# Patient Record
Sex: MALE | Race: WHITE | NOT HISPANIC OR LATINO | Employment: OTHER | URBAN - METROPOLITAN AREA
[De-identification: names, ages, dates, MRNs, and addresses within clinical notes are randomized per-mention and may not be internally consistent; named-entity substitution may affect disease eponyms.]

---

## 2023-06-28 ENCOUNTER — OFFICE VISIT (OUTPATIENT)
Dept: GASTROENTEROLOGY | Facility: CLINIC | Age: 43
End: 2023-06-28
Payer: OTHER GOVERNMENT

## 2023-06-28 VITALS — BODY MASS INDEX: 23.76 KG/M2 | HEIGHT: 68 IN | WEIGHT: 156.75 LBS

## 2023-06-28 DIAGNOSIS — K59.09 INTERMITTENT CONSTIPATION: ICD-10-CM

## 2023-06-28 DIAGNOSIS — Z87.898 HISTORY OF SHORTNESS OF BREATH: ICD-10-CM

## 2023-06-28 DIAGNOSIS — D64.9 NORMOCYTIC ANEMIA: ICD-10-CM

## 2023-06-28 DIAGNOSIS — Z86.010 HISTORY OF COLON POLYPS: ICD-10-CM

## 2023-06-28 DIAGNOSIS — K92.1 HEMATOCHEZIA: Primary | ICD-10-CM

## 2023-06-28 PROBLEM — F19.10 POLYSUBSTANCE ABUSE: Status: ACTIVE | Noted: 2023-06-28

## 2023-06-28 PROBLEM — F41.1 GENERALIZED ANXIETY DISORDER: Status: ACTIVE | Noted: 2023-06-28

## 2023-06-28 PROBLEM — M17.12 OSTEOARTHRITIS OF LEFT KNEE: Status: ACTIVE | Noted: 2023-06-28

## 2023-06-28 PROBLEM — N52.9 MALE ERECTILE DISORDER: Status: ACTIVE | Noted: 2023-06-28

## 2023-06-28 PROBLEM — F31.9 BIPOLAR DISORDER: Status: ACTIVE | Noted: 2023-06-28

## 2023-06-28 PROBLEM — F17.200 NICOTINE DEPENDENCE: Status: ACTIVE | Noted: 2023-06-28

## 2023-06-28 PROBLEM — K64.4 EXTERNAL HEMORRHOIDS: Status: ACTIVE | Noted: 2023-06-28

## 2023-06-28 PROBLEM — F11.11 HISTORY OF OPIOID ABUSE: Status: ACTIVE | Noted: 2023-06-28

## 2023-06-28 PROBLEM — Z59.00 HOMELESS SINGLE PERSON: Status: ACTIVE | Noted: 2023-06-28

## 2023-06-28 PROBLEM — K63.5 POLYP OF COLON: Status: ACTIVE | Noted: 2023-06-28

## 2023-06-28 PROBLEM — D50.9 IRON DEFICIENCY ANEMIA: Status: ACTIVE | Noted: 2023-06-28

## 2023-06-28 PROBLEM — F43.12 CHRONIC POST-TRAUMATIC STRESS DISORDER: Status: ACTIVE | Noted: 2023-06-28

## 2023-06-28 PROBLEM — F10.11 ALCOHOL ABUSE, IN REMISSION: Status: ACTIVE | Noted: 2023-06-28

## 2023-06-28 PROBLEM — F43.9 REACTION TO SEVERE STRESS, UNSPECIFIED: Status: ACTIVE | Noted: 2023-06-28

## 2023-06-28 PROBLEM — R51.9 HEADACHE: Status: ACTIVE | Noted: 2023-06-28

## 2023-06-28 PROBLEM — E78.5 HYPERLIPIDEMIA: Status: ACTIVE | Noted: 2023-06-28

## 2023-06-28 PROCEDURE — 99999 PR PBB SHADOW E&M-NEW PATIENT-LVL III: ICD-10-PCS | Mod: PBBFAC,,, | Performed by: NURSE PRACTITIONER

## 2023-06-28 PROCEDURE — 99203 OFFICE O/P NEW LOW 30 MIN: CPT | Mod: S$PBB,,, | Performed by: NURSE PRACTITIONER

## 2023-06-28 PROCEDURE — 99203 OFFICE O/P NEW LOW 30 MIN: CPT | Mod: PBBFAC,PO | Performed by: NURSE PRACTITIONER

## 2023-06-28 PROCEDURE — 99999 PR PBB SHADOW E&M-NEW PATIENT-LVL III: CPT | Mod: PBBFAC,,, | Performed by: NURSE PRACTITIONER

## 2023-06-28 PROCEDURE — 99203 PR OFFICE/OUTPT VISIT, NEW, LEVL III, 30-44 MIN: ICD-10-PCS | Mod: S$PBB,,, | Performed by: NURSE PRACTITIONER

## 2023-06-28 RX ORDER — SILDENAFIL 100 MG/1
100 TABLET, FILM COATED ORAL
COMMUNITY
Start: 2023-05-19

## 2023-06-28 RX ORDER — TRAZODONE HYDROCHLORIDE 100 MG/1
TABLET ORAL
COMMUNITY

## 2023-06-28 RX ORDER — CHOLECALCIFEROL (VITAMIN D3) 25 MCG
1000 TABLET ORAL DAILY
COMMUNITY

## 2023-06-28 RX ORDER — LANOLIN ALCOHOL/MO/W.PET/CERES
1000 CREAM (GRAM) TOPICAL
COMMUNITY
Start: 2023-05-23

## 2023-06-28 RX ORDER — VITAMIN B COMPLEX
1 CAPSULE ORAL DAILY
COMMUNITY

## 2023-06-28 RX ORDER — FOLIC ACID 1 MG/1
1 TABLET ORAL
COMMUNITY
Start: 2023-05-23

## 2023-06-28 RX ORDER — ZOLPIDEM TARTRATE 10 MG/1
1 TABLET ORAL NIGHTLY PRN
COMMUNITY
Start: 2023-03-23

## 2023-06-28 RX ORDER — TAMSULOSIN HYDROCHLORIDE 0.4 MG/1
0.4 CAPSULE ORAL
COMMUNITY
Start: 2023-05-19

## 2023-06-28 RX ORDER — CLONAZEPAM 1 MG/1
1 TABLET ORAL 2 TIMES DAILY PRN
COMMUNITY
Start: 2023-03-23

## 2023-06-28 NOTE — PATIENT INSTRUCTIONS
Lower GI Bleeding (Stable)  You have signs of blood in your stool. This is called rectal bleeding. The bleeding may have begun in another part of your gastrointestinal (GI) tract. If the blood is bright red, it is likely coming from the lower part of the GI tract. If the blood is black or dark, it might be coming from higher up in the GI tract. Very small amounts of GI bleeding may not be visible and can only be discovered during a test on your stool. Possible causes of lower GI bleeding include:  Hemorrhoids  Anal fissures  Diverticulitis  Inflammatory bowel disease (Crohn's disease or ulcerative colitis)  Polyps (growths) in the intestine  Note: Iron supplements and medicines for diarrhea or upset stomach can cause black stools. Foods such as licorice and red beets can also discolor the stool and be mistaken for bleeding. These are not bleeding and are not a cause for alarm.  Home care  You have not lost a large amount of blood and your condition appears stable at this time. You may resume normal activity as long as you feel well.  Avoid NSAIDs, such as aspirin, ibuprofen, or naproxen. They can irritate the stomach and cause further bleeding. If you are taking these medicines for other medical reasons, talk to your healthcare provider before you stop them.   Follow-up care  Follow up with your healthcare provider as advised. Further tests may be needed to find the cause of your bleeding.  When to seek medical advice  Call your healthcare provider for any of the following:  Large amount of rectal bleeding   Increasing abdominal pain  Weakness, dizziness  Call 911  Get emergency medical care if any of the following occur:  Loss of consciousness  Vomiting blood  Date Last Reviewed: 6/24/2015  © 7969-4511 [employer]. [address] [place] [address], [place] [address]. All rights reserved. This information is not intended as a substitute for professional medical care. Always follow your healthcare professional's  instructions.         Understanding Colon and Rectal Polyps    The colon (also called the large intestine) is a muscular tube that forms the last part of the digestive tract. It absorbs water and stores food waste. The colon is about 4 to 6 feet long. The rectum is the last 6 inches of the colon. The colon and rectum have a smooth lining composed of millions of cells. Changes in these cells can lead to growths in the colon that can become cancerous and should be removed. Multiple tests are available to screen for colon cancer, but the colonoscopy is the most recommended test. During colonoscopy, these polyps can be removed. How often you need this test depends on many things including your condition, your family history, symptoms, and what the findings were at the previous colonoscopy.   When the colon lining changes  Changes that happen in the cells that line the colon or rectum can lead to growths called polyps. Over a period of years, polyps can turn cancerous. Removing polyps early may prevent cancer from ever forming.  Polyps  Polyps are fleshy clumps of tissue that form on the lining of the colon or rectum. Small polyps are usually benign (not cancerous). However, over time, cells in a polyp can change and become cancerous. Certain types of polyps known as adenomatous polyps are premalignant. The risk for invasive cancer increases with the size of the polyp and certain cell and gene features. This means that they can become cancerous if they're not removed. Hyperplastic polyps are benign. They can grow quite large and not turn cancerous.   Cancer  Almost all colorectal cancers start when polyp cells begin growing abnormally. As a cancerous tumor grows, it may involve more and more of the colon or rectum. In time, cancer can also grow beyond the colon or rectum and spread to nearby organs or to glands called lymph nodes. The cells can also travel to other parts of the body. This is known as metastasis. The  earlier a cancerous tumor is removed, the better the chance of preventing its spread.    Date Last Reviewed: 8/1/2016  © 2456-8811 [employer]. [address] [place] [address], [place] [address]. All rights reserved. This information is not intended as a substitute for professional medical care. Always follow your healthcare professional's instructions.   Anemia  Anemia is a condition that occurs when your body does not have enough healthy red blood cells (RBCs). RBCs are the parts of your blood that carry oxygen throughout your body. A protein called hemoglobin allows your RBCs to absorb and release oxygen. Without enough RBCs or hemoglobin, your body doesn't get enough oxygen. Symptoms of anemia may then occur.    What are the symptoms of anemia?  Some people with anemia have no symptoms. But most people have symptoms that range from mild to severe. These can include:  Tiredness (fatigue)  Weakness  Pale skin  Shortness of breath  Dizziness or fainting  Rapid heartbeat  Trouble doing normal amounts of activity  Jaundice (yellowing of your eyes, skin, or mouth; dark urine)  What causes anemia?  Anemia can occur when your body:  Loses too much blood  Does not make enough RBCs  Destroys your RBCs at a faster rate than it can replace them  Does not make a normal amount of hemoglobin in your RBCs  These problems can occur for many reasons, including:  A condition that you are born with (congenital or inherited), such as sickle cell disease or thalassemia  Heavy bleeding for any reason, including injury, surgery, childbirth, or even heavy menstrual periods  Being low in certain nutrients, such as iron, folate, or vitamin B12, possibly from a poor diet or a condition like celiac disease or Crohn's disease  Certain chronic conditions like diabetes, arthritis, or kidney disease  Certain chronic infections like tuberculosis or HIV  Exposure to certain medicines, such as those used for chemotherapy  There are different  types of anemia. Your healthcare provider can tell you more about the type of anemia you have and what may have caused it.  How is anemia diagnosed?  To diagnose anemia, your healthcare provider orders blood tests. These can include:  Complete blood cell count (CBC). This test measures the amounts of the different types of blood cells.  Blood smear. This test checks the size and shape of your blood cells. To do the test, a drop of your blood is viewed under a microscope. A stain is used to make the blood cells easier to see.  Iron studies. These tests measure the amount of iron in your blood. Your body needs iron to make hemoglobin in your RBCs.  Vitamin B12 and folate studies. These tests check for some of the components that help give RBCs a normal size and shape.  Reticulocyte count. This test measures the amount of new RBCs that your bone marrow makes.  Hemoglobin electrophoresis. This test checks for problems with your hemoglobin in RBCs.  How is anemia treated?  Treatment for anemia is based on the type of anemia, its cause, and the severity of your symptoms. Treatments may include:  Diet changes. This involves increasing the amount of certain nutrients in your diet, such as iron, vitamin B12, or folate. Your healthcare provider may also prescribe nutrient supplements.  Medicines. Certain medicines treat the cause of your anemia. Others help build new RBCs or relieve symptoms. If a medicine is the cause of your anemia, you may need to stop or change it.  Blood transfusions. Replacing some of your blood can increase the number of healthy RBCs in your body.  Surgery. In some cases, your doctor may do surgery to treat the underlying cause of anemia. If you need surgery, your healthcare provider will explain the procedure and outline the risks and benefits for you.  What are the long-term concerns?  If you have a certain type of anemia, you can expect a full recovery after treatment. If you have other types of  anemia (especially a type you're born with), you will need to manage it for life. Your doctor can tell you more.  Date Last Reviewed: 12/1/2016 © 2000-2017 [employer]. [address] [place] [address], [place] [address]. All rights reserved. This information is not intended as a substitute for professional medical care. Always follow your healthcare professional's instructions.         Constipation (Adult)  Constipation means that you have bowel movements that are less frequent than usual. Stools often become very hard and difficult to pass.  Constipation is very common. At some point in life it affects almost everyone. Since everyone's bowel habits are different, what is constipation to one person may not be to another. Your healthcare provider may do tests to diagnose constipation. It depends on what he or she finds when evaluating you.    Symptoms of constipation include:  Abdominal pain  Bloating  Vomiting  Painful bowel movements  Itching, swelling, bleeding, or pain around the anus  Causes  Constipation can have many causes. These include:  Diet low in fiber  Too much dairy  Not drinking enough liquids  Lack of exercise or physical activity. This is especially true for older adults.  Changes in lifestyle or daily routine, including pregnancy, aging, work, and travel  Frequent use or misuse of laxatives  Ignoring the urge to have a bowel movement or delaying it until later  Medicines, such as certain prescription pain medicines, iron supplements, antacids, certain antidepressants, and calcium supplements  Diseases like irritable bowel syndrome, bowel obstructions, stroke, diabetes, thyroid disease, Parkinson disease, hemorrhoids, and colon cancer  Complications  Potential complications of constipation can include:  Hemorrhoids  Rectal bleeding from hemorrhoids or anal fissures (skin tears)  Hernias  Dependency on laxatives  Chronic constipation  Fecal impaction  Bowel obstruction or perforation  Home  care  All treatment should be done after talking with your healthcare provider. This is especially true if you have another medical problems, are taking prescription medicines, or are an older adult. Treatment most often involves lifestyle changes. You may also need medicines. Your healthcare provider will tell you which will work best for you. Follow the advice below to help avoid this problem in the future.  Lifestyle changes  These lifestyle changes can help prevent constipation:  Diet. Eat a high-fiber diet, with fresh fruit and vegetables, and reduce dairy intake, meats, and processed foods  Fluids. It's important to get enough fluids each day. Drink plenty of water when you eat more fiber. If you are on diet that limits the amount of fluid you can have, talk about this with your healthcare provider.  Regular exercise. Check with your healthcare provider first.  Medications  Take any medicines as directed. Some laxatives are safe to use only every now and then. Others can be taken on a regular basis. Talk with your doctor or pharmacist if you have questions.  Prescription pain medicines can cause constipation. If you are taking this kind of medicine, ask your healthcare provider if you should also take a stool softener.  Medicines you may take to treat constipation include:  Fiber supplements  Stool softeners  Laxatives  Enemas  Rectal suppositories  Follow-up care  Follow up with your healthcare provider if symptoms don't get better in the next few days. You may need to have more tests or see a specialist.  Call 911  Call 911 if any of these occur:  Trouble breathing  Stiff, rigid abdomen that is severely painful to touch  Confusion  Fainting or loss of consciousness  Rapid heart rate  Chest pain  When to seek medical advice  Call your healthcare provider right away if any of these occur:  Fever over 100.4°F (38°C)  Failure to resume normal bowel movements  Pain in your abdomen or back gets worse  Nausea or  vomiting  Swelling in your abdomen  Blood in the stool  Black, tarry stool  Involuntary weight loss  Weakness  Date Last Reviewed: 12/30/2015  © 3656-2296 [employer]. [address] [place] [address], [place] [address]. All rights reserved. This information is not intended as a substitute for professional medical care. Always follow your healthcare professional's instructions.

## 2023-06-28 NOTE — PROGRESS NOTES
Subjective:       Patient ID: Julius Holder is a 43 y.o. male Body mass index is 23.83 kg/m².    Chief Complaint: Rectal Bleeding ([facility] referral)    This patient is new to me.  Referring Provider: [facility] for hemorrhage of anus.     Patient is here with significant other, whom assisted with history.    GI Problem  The primary symptoms include hematochezia. Primary symptoms do not include fever, weight loss, fatigue, abdominal pain, nausea, vomiting, diarrhea, melena, hematemesis or dysuria.   The hematochezia began more than 1 week ago (started in 2022, went to ER for last episode of bleeding on 5/15/2023). Frequency: occurs once every 3 months, lasts 3-5 days of moderate amount of blood in bowl and on tissue; denies bleeding in between bowel movements. The hematochezia is a recurrent problem.   The illness is also significant for constipation (occasional, mild; bowel movements are currently once daily of formed stool). The illness does not include chills. Significant associated medical issues include hemorrhoids (s/p hemorrhoid banding).     Review of Systems   Constitutional:  Negative for appetite change, chills, fatigue, fever and weight loss.   HENT:  Negative for sore throat and trouble swallowing.    Respiratory:  Positive for shortness of breath (chronic, intermittent; history of smoking; worse when laying down, denies currentluy). Negative for cough and choking.    Cardiovascular:  Negative for chest pain.   Gastrointestinal:  Positive for anal bleeding, constipation (occasional, mild; bowel movements are currently once daily of formed stool) and hematochezia. Negative for abdominal pain, diarrhea, hematemesis, melena, nausea, rectal pain and vomiting.   Genitourinary:  Negative for difficulty urinating, dysuria and flank pain.   Neurological:  Negative for weakness.       Past Medical History:   Diagnosis Date    Bipolar disorder, unspecified     Colon polyps     PTSD (post-traumatic stress  disorder)      Past Surgical History:   Procedure Laterality Date    APPENDECTOMY      BAND HEMORRHOIDECTOMY      COLONOSCOPY  2019    colon polyps removed and hemorrhoids per patient report    KNEE SURGERY Left      Family History   Problem Relation Age of Onset    GI problems Father         gilbert wright tear    Testicular cancer Maternal Grandfather     Colon cancer Neg Hx     Ulcerative colitis Neg Hx     Stomach cancer Neg Hx     Crohn's disease Neg Hx     Esophageal cancer Neg Hx      Social History     Tobacco Use    Smoking status: Every Day     Packs/day: 0.50     Types: Cigarettes   Substance Use Topics    Alcohol use: No    Drug use: No     Wt Readings from Last 10 Encounters:   06/28/23 71.1 kg (156 lb 12 oz)   07/27/15 72.6 kg (160 lb)     CMP  Sodium   Date Value Ref Range Status   06/19/2023 141 136 - 144 mmol/L Final   07/27/2015 139 136 - 145 mmol/L Final     Potassium   Date Value Ref Range Status   06/19/2023 4.1 3.6 - 5.1 mmol/L Final   07/27/2015 3.6 3.5 - 5.1 mmol/L Final     Chloride   Date Value Ref Range Status   07/27/2015 108 95 - 110 mmol/L Final     CO2   Date Value Ref Range Status   06/19/2023 23 22 - 32 mmol/L Final   07/27/2015 26 23 - 29 mmol/L Final     Glucose   Date Value Ref Range Status   07/27/2015 105 70 - 110 mg/dL Final     BUN   Date Value Ref Range Status   07/27/2015 13 6 - 20 mg/dL Final     Blood Urea Nitrogen   Date Value Ref Range Status   06/19/2023 5 (L) 8 - 20 mg/dL Final     Creatinine   Date Value Ref Range Status   06/19/2023 0.81 (L) 0.90 - 1.30 mg/dL Final   07/27/2015 0.8 0.5 - 1.4 mg/dL Final     Calcium   Date Value Ref Range Status   06/19/2023 8.7 (L) 8.9 - 10.3 mg/dL Final   07/27/2015 8.5 (L) 8.7 - 10.5 mg/dL Final     Total Protein   Date Value Ref Range Status   06/19/2023 7.0 6.1 - 7.9 g/dL Final   07/27/2015 5.9 (L) 6.0 - 8.4 g/dL Final     Albumin   Date Value Ref Range Status   06/19/2023 4.3 3.5 - 4.8 g/dL Final   07/27/2015 3.4 (L) 3.5 - 5.2  g/dL Final     Total Bilirubin   Date Value Ref Range Status   06/19/2023 0.4 0.4 - 2.0 mg/dL Final   07/27/2015 0.2 0.1 - 1.0 mg/dL Final     Comment:     For infants and newborns, interpretation of results should be based  on gestational age, weight and in agreement with clinical  observations.  Premature Infant recommended reference ranges:  Up to 24 hours.............<8.0 mg/dL  Up to 48 hours............<12.0 mg/dL  3-5 days..................<15.0 mg/dL  6-29 days.................<15.0 mg/dL       Alkaline Phosphatase   Date Value Ref Range Status   06/19/2023 104 28 - 116 U/L Final   07/27/2015 67 55 - 135 U/L Final     AST   Date Value Ref Range Status   06/19/2023 31 12 - 40 U/L Final   07/27/2015 9 (L) 10 - 40 U/L Final     ALT   Date Value Ref Range Status   06/19/2023 46 5 - 56 U/L Final   07/27/2015 10 10 - 44 U/L Final     Anion Gap   Date Value Ref Range Status   06/19/2023 13 7 - 16 mmol/L Final   07/27/2015 5 (L) 8 - 16 mmol/L Final     eGFR if    Date Value Ref Range Status   07/27/2015 >60 >60 mL/min/1.73 m^2 Final     eGFR if non    Date Value Ref Range Status   07/27/2015 >60 >60 mL/min/1.73 m^2 Final     Comment:     Calculation used to obtain the estimated glomerular filtration  rate (eGFR) is the CKD-EPI equation. Since race is unknown   in our information system, the eGFR values for   -American and Non--American patients are given   for each creatinine result.       Lab Results   Component Value Date    TSH 1.536 07/27/2015     Care everywhere reviewed: 6/19/2023 CBC noted with RBD 4.66 (L), H&H 13.2 & 39.2 (both low); amylase WNL, lipase WNL; 5/15/2023 [facility] bleed    Objective:      Physical Exam  Vitals and nursing note reviewed.   Constitutional:       General: He is not in acute distress.     Appearance: Normal appearance. He is well-developed. He is not diaphoretic.   HENT:      Mouth/Throat:      Lips: Pink. No  lesions.      Mouth: Mucous membranes are moist. No oral lesions.      Tongue: No lesions.      Pharynx: Oropharynx is clear. No pharyngeal swelling or posterior oropharyngeal erythema.   Eyes:      General: No scleral icterus.     Conjunctiva/sclera: Conjunctivae normal.   Pulmonary:      Effort: Pulmonary effort is normal. No respiratory distress.      Breath sounds: Normal breath sounds. No wheezing.   Abdominal:      General: Bowel sounds are normal. There is no distension or abdominal bruit.      Palpations: Abdomen is soft. Abdomen is not rigid. There is no mass.      Tenderness: There is no abdominal tenderness. There is no guarding or rebound. Negative signs include Montaño's sign and McBurney's sign.      Comments: Deferred rectal examination.   Skin:     General: Skin is warm and dry.      Coloration: Skin is not jaundiced or pale.      Findings: No erythema or rash.   Neurological:      Mental Status: He is alert and oriented to person, place, and time.   Psychiatric:         Behavior: Behavior normal.         Thought Content: Thought content normal.         Judgment: Judgment normal.       Assessment:       1. Hematochezia    2. History of colon polyps    3. Normocytic anemia    4. Intermittent constipation    5. History of shortness of breath        Plan:       Hematochezia  - schedule Colonoscopy, discussed procedure with the patient, including risks and benefits, patient verbalized understanding  - discussed about different etiologies that can cause rectal bleeding, such as but not limited to diverticulosis, polyps, colon mass, colon inflammation or infection, anal fissure or hemorrhoids.   - You may resume normal activity as long as you feel well.  - Avoid/minimize NSAIDs such as ibuprofen (Advil, Motrin) and naproxen (Aleve and Naprosyn).  - Avoid alcohol.    History of colon polyps  - schedule Colonoscopy, discussed procedure with the patient, including risks and benefits, patient verbalized  understanding    Normocytic anemia  Recommend follow-up with Primary Care Provider for continued evaluation and management.    Intermittent constipation  Recommend daily exercise as tolerated, adequate water intake (six 8-oz glasses of water daily), and high fiber diet. OTC fiber supplements are recommended if diet does not reach daily fiber goal (20-30 grams daily), such as Metamucil, Citrucel, or FiberCon (take as directed, separate from other oral medications by >2 hours).  -Recommend taking an OTC stool softener such as Colace as directed to avoid hard stools and straining with bowel movements PRN  -If still no improvement with these measures, call/follow-up  - schedule Colonoscopy, discussed procedure with the patient, including risks and benefits, patient verbalized understanding    History of shortness of breath  - follow-up with PCP/pulmonology for continued evaluation and management ASAP  - if experiencing symptoms of headache, chest pain, severe/persistent shortness of breath, dizziness, and/or blurred vision, recommend going to ER for further evaluation and management  - recommend smoking cessation    Follow up in about 1 month (around 7/28/2023), or if symptoms worsen or fail to improve.      If no improvement in symptoms or symptoms worsen, call/follow-up at clinic or go to ER.        35 minutes of total time spent on the encounter, which includes face to face time and non-face to face time preparing to see the patient (e.g., review of tests), Obtaining and/or reviewing separately obtained history, Documenting clinical information in the electronic or other health record, Independently interpreting results (not separately reported) and communicating results to the patient/family/caregiver, or Care coordination (not separately reported).

## 2023-07-14 ENCOUNTER — TELEPHONE (OUTPATIENT)
Dept: ENDOSCOPY | Facility: HOSPITAL | Age: 43
End: 2023-07-14
Payer: OTHER GOVERNMENT

## 2023-07-14 NOTE — TELEPHONE ENCOUNTER
Hi! During the preop call this patient reports recently taking antibiotics for chest congestion. Pt reports still having symptoms of chest congestion. Pt planning to contact pcp for further evaluation and medication. Pt states he is okay with being rescheduled if safer; patient given all information for procedure 07/18. Please contact pt directly if patient needs to be rescheduled. Pt prefers to be contacted directly by phone instead of mychart. Thanks!!

## 2023-07-17 ENCOUNTER — TELEPHONE (OUTPATIENT)
Dept: GASTROENTEROLOGY | Facility: CLINIC | Age: 43
End: 2023-07-17
Payer: OTHER GOVERNMENT

## 2023-07-17 NOTE — TELEPHONE ENCOUNTER
----- Message from Tosha Ward, Patient Care Assistant sent at 7/17/2023 12:20 PM CDT -----  Regarding: appointment  Contact: pt  Type: Needs Medical Advice    Who Called:  pt     Best Call Back Number: [phone] (home)     Additional Information: pt states he would like a callback regarding his appointment on 7/18/23. Please call pt to advise. Thanks!

## 2024-08-12 ENCOUNTER — DOCUMENTATION ONLY (OUTPATIENT)
Dept: ADMINISTRATIVE | Facility: OTHER | Age: 44
End: 2024-08-12
Payer: OTHER GOVERNMENT

## 2024-08-13 NOTE — PROGRESS NOTES
2024      Francine Herndon M.D.   [address] [address]      RE: Julius Holder    MR#:  [id number]    :  1980       DX:   1.  Classical Hodgkin Disease, mixed cellularity type, involving multiple lymph nodes in the right and left neck.  Completion of staging work up in 2024 shows favorable stage IIA (greater than 10% weight loss in the last six  months that probably is  unrelated to Hodgkin Disease)     2.  History of severe anxiety and PTSD that by 2024, was improved.      Dear Francine:     I am seeing your patient in follow up today.  This note will supplement information since I saw the patient in consultation on  of this year.  When I saw the patient on , he was having severe anxiety and PTSD.  The patient no-showed for all his scheduled workup, included sed rate,  PET/CT scan, and medical oncology consultation.      I spoke with the patient about this, and he said that he was in a very dark time then and this has significantly improved since then.     On , the patient finally went for the Medical Oncology consultation, that had been requested seven or eight months ago.  The patient saw Dr. Herndon.  Dr. Herndon documented an improved mental health.  LDH, CBC, comprehensive metabolic profile were all normal.     On , PET-CT scan was performed.  The patient refused the Aquaplast mask.  When I asked the patient why he refused the Aquaplast mask that was intended so that it could be used for radiation therapy planning, the patient said he mainly had an issue when his arms were immobilized by the side of him, and he said that the face part of the mask also caused him bother.  I have reviewed the PET-CT images carefully, as well as read the radiologist's report.  There has been progression in the right greater than left cervical lymphadenopathy since his previous PET-CT scan on 2023.  In the right side,  there are enlarged lymph nodes in levels II, III, IV and V.  There are smaller amounts of lymphadenopathy in the left neck, mostly in the level II/III region.  There was no other evidence of hypermetabolic activity, and thus the patient's only sites of hypermetabolic tumor were still confined to the right and left neck region.     The patient has continued to complain of ringing in his ears, as well as severe headaches.  For this reason, on August 5, Dr. Herndon obtained an MR of the brain without IV contrast.  The MR scan was essentially unremarkable, except for a few scattered foci up to 4 mm that were nonspecific.  There was no evidence of a mass lesion or mass effect.     On August 6, the patient again saw Dr. Herndon.  Dr. Herndon thought that the patient had favorable stage IIA disease.  Dr. Herndon, she thought proceeding with two cycles of ABD chemotherapy, followed by re-staging PET-CT was appropriate.   If the patient achieved a Deauville 1 to 2, consider involved-site irradiation or consider additional two cycles of chemotherapy.  The patient has been referred for Radiation Oncology consultation.     The patient denies fevers or chills.  His weight has gone down to 150 pounds, and this compares with a weight of 170 when I saw him on January 5.  In July of last year, he weighed 160 pounds.  When I asked the patient about his weight fluctuation like this, he attributed the weight to being less active, and according to the patient when he is less active he eats less.  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  The patient said his ringing in his ears is better. 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      Past medical history has been updated.     Physical exam shows a  man in no distress.  He appears to have a good performance status (the patient appears to have a performance status of 1).  A diagram is drawn that illustrates the right neck lymphadenopathy.  He has a 4 cm mass in the right level II region.  He has multiple 1.5 to 2 cm lymph nodes in the level III and IV regions.  His multiple up to 1 cm lymph nodes in right level V.  In the left neck, he has several up to 1.5 cm lymph nodes in the left level, the inferior level II region, as well as level III region.  There is no axillary or groin lymphadenopathy.  Examination of the oral cavity shows that the patient has full upper and lower dentures.     Assessment and Plan:  The patient has had an eight-month period of time where he had mental health issues; however, he has recently completed his workup, and his current evaluation shows favorable stage IIA (greater than 10% weight loss in the last six  months that probably is  unrelated to Hodgkin Disease).        It is unclear to me about whether his weight loss is related to Hodgkin's or related to other factors.  It seems likely that the weight loss is not related to Hodgkin's disease.  I certainly would agree with you that two cycles of chemotherapy and then re-staging PET-CT scan.  In this patient's situation, even if the patient has a very good response to chemotherapy, treating with four cycles of chemotherapy alone may have some advantages.  The patient was not able to do his previous PET-CT scan with an immobilization device, presumably related to his PTSD.  In addition, the lymphadenopathy in his neck would certainly involve significant amounts of salivary gland function, even with low-dose involved-site irradiation.  All these decisions will be made after the re-staging PET-CT scan, after two cycles of chemotherapy.     50 minutes was spent in this re-consultation, of which 15 minutes was spent in record review, 25 minutes was spent directly with the patient, and 10  minutes was spent in documentation.     I appreciate the opportunity to consult on your patients.  Please call me with any questions or comments.     Sincerely,          Electronically Signed By:  Baltazar Mcallister M.D.      GWEN/Shaila  DD:  08/12/2024  DT:  08/12/2024  Job #:  [id number]    CC:  Maximilian Quiroz M.D., [address] [facility] [address],[place] [address], Fax: [phone]        Sirena Lopez D.O., [address] [place] [address], Fax: [phone]        Francine Herndon M.D., [address] [address], Fax: [phone]        Juan Winchester M.D., [facility], [address] [place] [id number], Fax: [phone]